# Patient Record
Sex: FEMALE | Race: WHITE
[De-identification: names, ages, dates, MRNs, and addresses within clinical notes are randomized per-mention and may not be internally consistent; named-entity substitution may affect disease eponyms.]

---

## 2017-08-01 ENCOUNTER — HOSPITAL ENCOUNTER (OUTPATIENT)
Dept: HOSPITAL 45 - C.MAMM | Age: 72
Discharge: HOME | End: 2017-08-01
Attending: NURSE PRACTITIONER
Payer: COMMERCIAL

## 2017-08-01 DIAGNOSIS — Z12.31: Primary | ICD-10-CM

## 2017-08-01 NOTE — MAMMOGRAPHY REPORT
BILATERAL DIGITAL SCREENING MAMMOGRAM WITH CAD: 8/1/2017

CLINICAL HISTORY: Routine screening.  





TECHNIQUE: Bilateral CC and MLO views were obtained.  Current study was also evaluated with a Compute
r Aided Detection (CAD) system.  



COMPARISON: Comparison is made to exams dated:  6/23/2016 mammogram, 6/22/2015 mammogram, 6/16/2014 m
ammogram, 6/13/2013 mammogram, 6/12/2012 mammogram, and 11/16/2009 mammogram - [facility].   



BREAST COMPOSITION:  There are scattered areas of fibroglandular density in both breasts.  



FINDINGS: There are stable loosely grouped round and punctate microcalcifications in the central righ
t breast, which appears similar on all available prior mammograms dating back to at least 09/02/2008,
 therefore likely benign.  No new suspicious mass, architectural distortion or cluster of microcalcif
ications is seen.  



IMPRESSION:  ACR BI-RADS CATEGORY 1: NEGATIVE

There is no mammographic evidence of malignancy. A 1 year screening mammogram is recommended.  The pa
tient will receive written notification of the results.  





Approximately 10% of breast cancers are not detected with mammography. A negative mammographic report
 should not delay biopsy if a clinically suggestive mass is present.



Helen Cardozo M.D.          

ay/:8/1/2017 15:12:49  



Imaging Technologist: Alea AVERY(R)(M), [facility]

letter sent: Normal 1/2  

BI-RADS Code: ACR BI-RADS Category 1: Negative

## 2017-08-29 ENCOUNTER — HOSPITAL ENCOUNTER (EMERGENCY)
Dept: HOSPITAL 45 - C.EDB | Age: 72
Discharge: HOME | End: 2017-08-29
Payer: COMMERCIAL

## 2017-08-29 VITALS
BODY MASS INDEX: 27.21 KG/M2 | WEIGHT: 169.32 LBS | WEIGHT: 169.32 LBS | HEIGHT: 65.98 IN | BODY MASS INDEX: 27.21 KG/M2 | HEIGHT: 65.98 IN

## 2017-08-29 VITALS — OXYGEN SATURATION: 95 %

## 2017-08-29 VITALS — HEART RATE: 88 BPM | OXYGEN SATURATION: 95 % | SYSTOLIC BLOOD PRESSURE: 137 MMHG | DIASTOLIC BLOOD PRESSURE: 82 MMHG

## 2017-08-29 VITALS — TEMPERATURE: 98.06 F

## 2017-08-29 DIAGNOSIS — E86.0: ICD-10-CM

## 2017-08-29 DIAGNOSIS — Z83.3: ICD-10-CM

## 2017-08-29 DIAGNOSIS — Z91.14: ICD-10-CM

## 2017-08-29 DIAGNOSIS — F41.9: ICD-10-CM

## 2017-08-29 DIAGNOSIS — F31.9: ICD-10-CM

## 2017-08-29 DIAGNOSIS — Z79.899: ICD-10-CM

## 2017-08-29 DIAGNOSIS — R00.0: Primary | ICD-10-CM

## 2017-08-29 LAB
ANION GAP SERPL CALC-SCNC: 4 MMOL/L (ref 3–11)
BASOPHILS # BLD: 0.02 K/UL (ref 0–0.2)
BASOPHILS NFR BLD: 0.3 %
BUN SERPL-MCNC: 13 MG/DL (ref 7–18)
BUN/CREAT SERPL: 13.2 (ref 10–20)
CALCIUM SERPL-MCNC: 9 MG/DL (ref 8.5–10.1)
CHLORIDE SERPL-SCNC: 107 MMOL/L (ref 98–107)
CO2 SERPL-SCNC: 28 MMOL/L (ref 21–32)
COMPLETE: YES
CREAT CL PREDICTED SERPL C-G-VRATE: 55.2 ML/MIN
CREAT SERPL-MCNC: 0.95 MG/DL (ref 0.6–1.2)
EOSINOPHIL NFR BLD AUTO: 206 K/UL (ref 130–400)
GLUCOSE SERPL-MCNC: 93 MG/DL (ref 70–99)
HCT VFR BLD CALC: 45.8 % (ref 37–47)
IG%: 0.2 %
IMM GRANULOCYTES NFR BLD AUTO: 17.8 %
LYMPHOCYTES # BLD: 1.14 K/UL (ref 1.2–3.4)
MAGNESIUM SERPL-MCNC: 2.4 MG/DL (ref 1.8–2.4)
MCH RBC QN AUTO: 28.6 PG (ref 25–34)
MCHC RBC AUTO-ENTMCNC: 32.3 G/DL (ref 32–36)
MCV RBC AUTO: 88.4 FL (ref 80–100)
MONOCYTES NFR BLD: 8.9 %
NEUTROPHILS # BLD AUTO: 1.6 %
NEUTROPHILS NFR BLD AUTO: 71.2 %
PHOSPHATE SERPL-MCNC: 3 MG/DL (ref 2.5–4.9)
PMV BLD AUTO: 11 FL (ref 7.4–10.4)
POTASSIUM SERPL-SCNC: 4.1 MMOL/L (ref 3.5–5.1)
RBC # BLD AUTO: 5.18 M/UL (ref 4.2–5.4)
SODIUM SERPL-SCNC: 139 MMOL/L (ref 136–145)
TSH SERPL-ACNC: 1.27 UIU/ML (ref 0.3–4.5)
WBC # BLD AUTO: 6.39 K/UL (ref 4.8–10.8)

## 2017-08-29 NOTE — EMERGENCY ROOM VISIT NOTE
History


Report prepared by Arlin:  Sandrita Boland


Under the Supervision of:  Dr. Mateusz Lamb M.D.


First contact with patient:  15:46


Chief Complaint:  TACHYCARDIA


Stated Complaint:  PULSE RATE HIGH


Nursing Triage Summary:  


Pt c/o "Beating in my throat" for 1 1/2 hours. States history of the same a few 


years ago, tachycardia.





History of Present Illness


The patient is a 72 year old white female with a past medical history of 

tachycardia, depression, and anxiety who presents to the ED with a cc of 

tachycardia beginning 2 hours PTA. She was just going about her usual routine 

when her symptoms started. She was not asleep. She does use caffeine. The 

patient states that she had a similar episode 4 years ago. She was prescribed 

metoprolol 100mg once daily, which she recently stopped taking, "Because I was 

doing so well." Positive shortness of breath. Negative sore throat, cough, 

fevers, chills, chest pain, abdominal pain, pain or swelling in the legs, 

recent travel, recent surgery, estrogen use. She denies any recent changes to 

her medications. She denies any personal or family history of thyroid issues.





   Source of History:  patient


   Onset:  2 hours PTA


   Position:  chest


   Quality:  other (tachycardia)


   Timing:  constant


   Associated Symptoms:  + SOB, No fevers, No chills, No sorethroat, No cough, 

No chest pain, No abdominal pain





Review of Systems


See HPI for pertinent positives and negatives.  A total of ten systems were 

reviewed and were otherwise negative.





Past Medical & Surgical


Medical Problems:


(1) Acute gastritis


(2) Acute gastritis


(3) Bipolar disorder, unspecified


(4) Depression


(5) Esophageal reflux


(6) Gastroparesis


(7) Unstable angina


(8) Unstable angina








Family History





Diabetes mellitus


Heart disease





Social History


Smoking Status:  Never Smoker


Smokeless Tobacco Use:  No


Alcohol Use:  none


Drug Use:  none


Marital Status:  


Housing Status:  lives with family


Occupation Status:  employed





Current/Historical Medications


Scheduled


Lamotrigine (Lamictal), 300 MG PO QAM


Metoprolol Succ (Toprol Xl) (Toprol-Xl), 25 MG PO BID


Metoprolol Succinate (Toprol Xl), 50 MG PO DAILY


Olanzapine (Zyprexa), 2.5-5 MG PO HS


Venlafaxine Hcl (Venlafaxine Hcl Er), 75 MG PO QAM





Scheduled PRN


Omeprazole (Prilosec), 20 MG PO DAILY PRN for ACID REFLUX





Allergies


Coded Allergies:  


     No Known Allergies (Verified , 11/15/11)





Physical Exam


Vital Signs











  Date Time  Temp Pulse Resp B/P (MAP) Pulse Ox O2 Delivery O2 Flow Rate FiO2


 


8/29/17 18:24  88 18 137/82 95   


 


8/29/17 17:37  81 16 140/98 97 Room Air  


 


8/29/17 16:25  84      


 


8/29/17 16:13     95 Room Air  


 


8/29/17 16:13  84 20 152/94 95 Room Air  


 


8/29/17 14:38 36.7 111 18 157/94 96 Room Air  











Physical Exam


GENERAL: Awake, alert, well-appearing, NAD


HENT: Normocephalic, atraumatic.


EYES: Normal conjunctiva. Sclera non-icteric.


NECK: Supple. No nuchal rigidity. FROM.


RESPIRATORY: Bibasilar crackles, no rhonchi or wheezing 


CARDIAC: Tachycardic rate and regular rhythm, no MRG


ABDOMEN: Soft, NTND, BS+


MSK: No chest wall TTP, no LE edema, no calf pain bilaterally, 2+ DP pulses


NEURO: GCS 15, CN 2-12 intact, moves all 4s on command


SKIN: No rash or jaundice noted.





Medical Decision & Procedures


ER Provider


Diagnostic Interpretation:


Radiology results as stated below per my review and radiologist interpretation: 








CHEST ONE VIEW PORTABLE





CLINICAL HISTORY: tachycardia, SOB    





COMPARISON STUDY:  4/29/2014





FINDINGS: There is persistent elevation/eventration left hemidiaphragm. There is


left basilar subsegmental atelectatic change. There is no failure. The right


lung is clear. No significant pleural effusions are visualized.[ 





IMPRESSION: Stable elevation/eventration left hemidiaphragm with left basilar


atelectasis. No acute findings











Electronically signed by:  Alvino Peña M.D.


8/29/2017 4:13 PM





Dictated Date/Time:  8/29/2017 4:12 PM





Laboratory Results


8/29/17 16:13








Red Blood Count 5.18, Mean Corpuscular Volume 88.4, Mean Corpuscular Hemoglobin 

28.6, Mean Corpuscular Hemoglobin Concent 32.3, Mean Platelet Volume 11.0, 

Neutrophils (%) (Auto) 71.2, Lymphocytes (%) (Auto) 17.8, Monocytes (%) (Auto) 

8.9, Eosinophils (%) (Auto) 1.6, Basophils (%) (Auto) 0.3, Neutrophils # (Auto) 

4.55, Lymphocytes # (Auto) 1.14, Monocytes # (Auto) 0.57, Eosinophils # (Auto) 

0.10, Basophils # (Auto) 0.02





8/29/17 16:13

















Test


  8/29/17


16:13 8/29/17


16:19


 


White Blood Count


  6.39 K/uL


(4.8-10.8) 


 


 


Red Blood Count


  5.18 M/uL


(4.2-5.4) 


 


 


Hemoglobin


  14.8 g/dL


(12.0-16.0) 


 


 


Hematocrit 45.8 % (37-47)  


 


Mean Corpuscular Volume


  88.4 fL


() 


 


 


Mean Corpuscular Hemoglobin


  28.6 pg


(25-34) 


 


 


Mean Corpuscular Hemoglobin


Concent 32.3 g/dl


(32-36) 


 


 


Platelet Count


  206 K/uL


(130-400) 


 


 


Mean Platelet Volume


  11.0 fL


(7.4-10.4) 


 


 


Neutrophils (%) (Auto) 71.2 %  


 


Lymphocytes (%) (Auto) 17.8 %  


 


Monocytes (%) (Auto) 8.9 %  


 


Eosinophils (%) (Auto) 1.6 %  


 


Basophils (%) (Auto) 0.3 %  


 


Neutrophils # (Auto)


  4.55 K/uL


(1.4-6.5) 


 


 


Lymphocytes # (Auto)


  1.14 K/uL


(1.2-3.4) 


 


 


Monocytes # (Auto)


  0.57 K/uL


(0.11-0.59) 


 


 


Eosinophils # (Auto)


  0.10 K/uL


(0-0.5) 


 


 


Basophils # (Auto)


  0.02 K/uL


(0-0.2) 


 


 


RDW Standard Deviation


  47.6 fL


(36.4-46.3) 


 


 


RDW Coefficient of Variation


  14.8 %


(11.5-14.5) 


 


 


Immature Granulocyte % (Auto) 0.2 %  


 


Immature Granulocyte # (Auto)


  0.01 K/uL


(0.00-0.02) 


 


 


Anion Gap


  4.0 mmol/L


(3-11) 


 


 


Est Creatinine Clear Calc


Drug Dose 55.2 ml/min 


  


 


 


Estimated GFR (


American) 68.9 


  


 


 


Estimated GFR (Non-


American 59.4 


  


 


 


BUN/Creatinine Ratio 13.2 (10-20)  


 


Calcium Level


  9.0 mg/dl


(8.5-10.1) 


 


 


Phosphorus Level


  3.0 mg/dl


(2.5-4.9) 


 


 


Magnesium Level


  2.4 mg/dl


(1.8-2.4) 


 


 


Thyroid Stimulating Hormone


(TSH) 1.270 uIu/ml


(0.300-4.500) 


 


 


Bedside Troponin I


  


  < 0.030 ng/ml


(0-0.045)





Laboratory results reviewed by me





Medications Administered











 Medications


  (Trade)  Dose


 Ordered  Sig/Mayuri


 Route  Start Time


 Stop Time Status Last Admin


Dose Admin


 


 Sodium Chloride  500 ml @ 


 500 mls/hr  Q1H STAT


 IV  8/29/17 15:58


 8/29/17 16:57 DC 8/29/17 15:58


500 MLS/HR


 


 Metoprolol


 Succinate


  (Toprol Xl Tab)  25 mg  ONE  STAT


 PO  8/29/17 17:02


 8/29/17 17:03 DC 8/29/17 17:37


25 MG











ECG


Indication:  tachycardia


Rate (beats per minute):  92


Rhythm:  normal sinus


Findings:  Q waves (lead 3), no ectopy





ED Course


1546: The patient was evaluated in room B8. A complete history and physical 

exam was performed.





1558:  ml @ 500 mls/hr IV





1657: I updated the patient and her . They are in agreement with the 

treatment plan. 





1702: Toprol XI tab 25 mg PO





Medical Decision


Differential diagnosis:


Etiologies such as premature contractions, electrolyte abnormality, cardiac 

dysrhythmia, thyroid dysfunction, pulmonary embolism, infection, 

gastrointestinal, as well as others were entertained.





Patient was evaluated at the bedside.  The patient's heart rate had improved by 

the time of evaluation.  Patient's EKG fairly unremarkable.  Patient had 

negative troponin as well as fairly unremarkable blood work with normal TSH.  

Patient was feeling much improved.  Patient of note has stopped taking her by 

mouth beta blocker back.  Patient was given 25 mg of Toprol XL.  Patient was 

observed and had no abnormal drops in blood pressure.  Patient was otherwise a 

symptomatic.  Patient was feeling improved.  Patient was told to begin taking 

the medication 25 mg twice a day but was told to take her pulse prior to taking 

her medication and if it was less than or equal to 70 bpm she should skip a 

dose.  Patient was told to follow up with her PCP in order to further take her 

medications and tailor them to her.  Patient was strict follow-up discharge, 

return precautions.  Patient care patient is discharged home.





Medication Reconcilliation


Current Medication List:  was personally reviewed by me





Blood Pressure Screening


Patient's blood pressure:  Elevated blood pressure


Blood pressure disposition:  Referred to PCP





Impression





 Primary Impression:  


 Tachycardia


 Additional Impressions:  


 Dehydration


 Noncompliance with medication regimen





Scribe Attestation


The scribe's documentation has been prepared under my direction and personally 

reviewed by me in its entirety. I confirm that the note above accurately 

reflects all work, treatment, procedures, and medical decision making performed 

by me.





Departure Information


Dispostion


Home / Self-Care





Prescriptions





Metoprolol Succ (Toprol Xl) (Toprol-Xl) 25 Mg Tabcr


25 MG PO BID for 30 Days, #60 TAB


   Prov: Mateusz Lamb M.D.         8/29/17





Referrals


Karlee Fernando C.R.N.P (PCP)





Patient Instructions


ED Palpitations, Metoprolol extended-release tablets, My [facility]





Additional Instructions





Please return to the emergency department if you have worsening or recurrent 

symptoms not amenable to at-home treatment.  Please call for a follow-up 

appointment with her primary care physician.  Please take your medications as 

prescribed.  If you have other concerns and/or complaints please feel free to 

also call your primary care physician's office or return the ED for further 

evaluation, management, and treatment.





Please take your medication (Toprol Xl) as prescribed, but please take your 

pulse prior to taking your medication and if it is < 70, please skip a dose. 

Please follow up with your PCP to better manage this medication. 





You were found to have an elevated blood pressure today (>120 sytolic or >90 

diastolic). Per medicare guidelines, you need to follow up with this blood 

pressure screening with your Primary Care Physician (PCP). For a new PCP call 

[phone].





You have been examined and treated today on an emergency basis only. This is 

not a substitute for, or an effort to provide, complete comprehensive medical 

care. It is impossible to recognize and treat all injuries or illnesses in a 

single emergency department visit. It is therefore important that you follow up 

closely with [facility], your PCP, and/or your specialists 

including a cardiologist if need be. Call as soon as possible for an 

appointment.





Thank you for your time and consideration. I look forward to speaking with you 

again soon. Please don't hesitate to call us if you have any questions.





Problem Qualifiers

## 2017-08-29 NOTE — DIAGNOSTIC IMAGING REPORT
CHEST ONE VIEW PORTABLE



CLINICAL HISTORY: tachycardia, SOB    



COMPARISON STUDY:  4/29/2014



FINDINGS: There is persistent elevation/eventration left hemidiaphragm. There is

left basilar subsegmental atelectatic change. There is no failure. The right

lung is clear. No significant pleural effusions are visualized.[ 



IMPRESSION: Stable elevation/eventration left hemidiaphragm with left basilar

atelectasis. No acute findings







Electronically signed by:  Alvino Peña M.D.

8/29/2017 4:13 PM



Dictated Date/Time:  8/29/2017 4:12 PM

## 2017-09-05 ENCOUNTER — HOSPITAL ENCOUNTER (OUTPATIENT)
Dept: HOSPITAL 45 - C.LABPVFM | Age: 72
Discharge: HOME | End: 2017-09-05
Attending: GENERAL PRACTICE
Payer: COMMERCIAL

## 2017-09-05 DIAGNOSIS — E78.5: Primary | ICD-10-CM

## 2017-09-05 LAB
CHOLEST/HDLC SERPL: 2.9 {RATIO}
GLUCOSE UR QL: 71 MG/DL
KETONES UR QL STRIP: 121 MG/DL
NITRITE UR QL STRIP: 83 MG/DL (ref 0–150)
PH UR: 209 MG/DL (ref 0–200)
VERY LOW DENSITY LIPOPROT CALC: 17 MG/DL

## 2018-02-08 ENCOUNTER — HOSPITAL ENCOUNTER (OUTPATIENT)
Dept: HOSPITAL 45 - C.LABPVFM | Age: 73
Discharge: HOME | End: 2018-02-08
Attending: NURSE PRACTITIONER
Payer: COMMERCIAL

## 2018-02-08 DIAGNOSIS — E78.5: ICD-10-CM

## 2018-02-08 DIAGNOSIS — I47.1: Primary | ICD-10-CM

## 2018-02-08 LAB
BUN SERPL-MCNC: 21 MG/DL (ref 7–18)
CALCIUM SERPL-MCNC: 9.3 MG/DL (ref 8.5–10.1)
CO2 SERPL-SCNC: 29 MMOL/L (ref 21–32)
CREAT SERPL-MCNC: 1.07 MG/DL (ref 0.6–1.2)
GLUCOSE SERPL-MCNC: 96 MG/DL (ref 70–99)
POTASSIUM SERPL-SCNC: 3.8 MMOL/L (ref 3.5–5.1)
SODIUM SERPL-SCNC: 139 MMOL/L (ref 136–145)

## 2018-04-04 ENCOUNTER — HOSPITAL ENCOUNTER (OUTPATIENT)
Dept: HOSPITAL 45 - C.RADPV | Age: 73
Discharge: HOME | End: 2018-04-04
Attending: FAMILY MEDICINE
Payer: COMMERCIAL

## 2018-04-04 DIAGNOSIS — M43.16: ICD-10-CM

## 2018-04-04 DIAGNOSIS — M54.5: Primary | ICD-10-CM

## 2018-04-04 DIAGNOSIS — M51.37: ICD-10-CM

## 2018-04-04 NOTE — DIAGNOSTIC IMAGING REPORT
L-SPINE MIN 4 VIEWS ROUTINE



CLINICAL HISTORY: Low back pain    



COMPARISON STUDY:  3/11/2014



FINDINGS: There is no pathologic bowel dilatation. There is an old mild superior

endplate L1 compression deformity. There is marked disc space narrowing at the

L5-S1 level. There is a grade 1 spondylolisthesis of L4 on L5. There are no

acute fractures.



IMPRESSION:  

1. No acute fractures

2. Grade 1 spondylolisthesis of L4 on L5

3. Marked disc space narrowing at the L5-S1 level. 









Electronically signed by:  Alvino Peña M.D.

4/4/2018 1:29 PM



Dictated Date/Time:  4/4/2018 1:29 PM

## 2018-08-06 ENCOUNTER — HOSPITAL ENCOUNTER (OUTPATIENT)
Dept: HOSPITAL 45 - C.MAMM | Age: 73
Discharge: HOME | End: 2018-08-06
Attending: NURSE PRACTITIONER
Payer: COMMERCIAL

## 2018-08-06 DIAGNOSIS — Z12.31: Primary | ICD-10-CM

## 2018-08-07 NOTE — MAMMOGRAPHY REPORT
BILATERAL DIGITAL SCREENING MAMMOGRAM TOMOSYNTHESIS WITH CAD: 8/6/2018

CLINICAL HISTORY: Routine screening. Patient has no complaints.  





TECHNIQUE: The study was acquired using full field digital technology and interpreted from soft copy.
 Breast tomosynthesis in addition to standard 2D mammography was performed. Current study was also ev
aluated with a Computer Aided Detection (CAD) system.  



COMPARISON: Comparison is made to exams dated:  8/1/2017 mammogram, 6/23/2016 mammogram, 6/22/2015 ma
mmogram, 6/16/2014 mammogram, 6/13/2013 mammogram, and 6/18/2012 mammogram - [facility]
nter.   

BREAST COMPOSITION: There are scattered areas of fibroglandular density in both breasts.  



FINDINGS: There are stable loosely grouped rounded punctate calcifications in the retroareolar, middl
e to posterior right breast, that appears similar in number and configuration dating back to at least
 9/2/2008, therefore likely benign. No new suspicious mass, architectural distortion or cluster of mi
crocalcifications is seen.  



IMPRESSION: ACR BI-RADS CATEGORY 1: NEGATIVE

There is no mammographic evidence of malignancy. A 1 year screening mammogram is recommended.(08/07/2
019)  The patient will receive written notification of the results.  





Some breast cancers are not detected with mammography. A negative mammographic report should not yordan
y biopsy if a clinically suggestive mass is present.



Helen Cardozo M.D.          

ay/:8/6/2018 17:09:34  



Imaging Technologist: RT Juliana(MAISHA)(M), [facility]

letter sent: Normal 1/2  

BI-RADS Code: ACR BI-RADS Category 1: Negative